# Patient Record
Sex: MALE | Race: WHITE | URBAN - METROPOLITAN AREA
[De-identification: names, ages, dates, MRNs, and addresses within clinical notes are randomized per-mention and may not be internally consistent; named-entity substitution may affect disease eponyms.]

---

## 2020-10-02 ENCOUNTER — MEDICAL CORRESPONDENCE (OUTPATIENT)
Dept: HEALTH INFORMATION MANAGEMENT | Facility: CLINIC | Age: 35
End: 2020-10-02

## 2020-10-02 ENCOUNTER — TRANSFERRED RECORDS (OUTPATIENT)
Dept: HEALTH INFORMATION MANAGEMENT | Facility: CLINIC | Age: 35
End: 2020-10-02

## 2020-10-05 NOTE — TELEPHONE ENCOUNTER
FUTURE VISIT INFORMATION      FUTURE VISIT INFORMATION:    Date: 10/13/20    Time: 1:15pm    Location: [facility]  REFERRAL INFORMATION:    Referring provider:  Rafita Dickson Recurrent     Referring providers clinic:  [facility] [place]    Reason for visit/diagnosis  Chalazion Left upper lid    RECORDS REQUESTED FROM:       Clinic name Comments Records Status Imaging Status   [facility] OV/referral 10/2  OV/notes 1/10/19-10/2/20 Epic/ Care Everywhere

## 2020-10-08 NOTE — TELEPHONE ENCOUNTER
FUTURE VISIT INFORMATION      FUTURE VISIT INFORMATION:    Date: 10/27/20    Time: 1:15pm    Location: [facility]  REFERRAL INFORMATION:    Referring provider:  Rafita Dickson    Referring providers clinic:  [facility]    Reason for visit/diagnosis  Recurrent Chalazion Left upper lid    RECORDS REQUESTED FROM:         Clinic name Comments Records Status Imaging Status   [facility] OV/referral 10/2  OV/notes 1/10/19-10/2/20 Epic/ Care Everywhere

## 2020-10-13 ENCOUNTER — PRE VISIT (OUTPATIENT)
Dept: OPHTHALMOLOGY | Facility: CLINIC | Age: 35
End: 2020-10-13

## 2020-10-27 ENCOUNTER — PRE VISIT (OUTPATIENT)
Dept: OPHTHALMOLOGY | Facility: CLINIC | Age: 35
End: 2020-10-27